# Patient Record
Sex: FEMALE
[De-identification: names, ages, dates, MRNs, and addresses within clinical notes are randomized per-mention and may not be internally consistent; named-entity substitution may affect disease eponyms.]

---

## 2020-01-16 ENCOUNTER — NURSE TRIAGE (OUTPATIENT)
Dept: OTHER | Facility: CLINIC | Age: 60
End: 2020-01-16

## 2020-01-16 NOTE — TELEPHONE ENCOUNTER
Reason for Disposition   Earache  (Exceptions: brief ear pain of < 60 minutes duration, earache occurring during air travel    Protocols used: EARACHE-ADULT-AH    Pt started with scratchy throat, some chest congestion and right ear pain today. She states is most concerned about the ear pain. She rates that pain a 5/10 and it is \"crunching\" at times. No drainage from the ear. Caller reports symptoms as documented above. Caller informed of disposition. Care advice as documented.